# Patient Record
Sex: MALE | Race: AMERICAN INDIAN OR ALASKA NATIVE
[De-identification: names, ages, dates, MRNs, and addresses within clinical notes are randomized per-mention and may not be internally consistent; named-entity substitution may affect disease eponyms.]

---

## 2018-11-15 ENCOUNTER — HOSPITAL ENCOUNTER (EMERGENCY)
Dept: HOSPITAL 42 - ED | Age: 48
Discharge: HOME | End: 2018-11-15
Payer: COMMERCIAL

## 2018-11-15 VITALS — OXYGEN SATURATION: 100 % | RESPIRATION RATE: 18 BRPM

## 2018-11-15 VITALS — TEMPERATURE: 97.8 F | DIASTOLIC BLOOD PRESSURE: 89 MMHG | SYSTOLIC BLOOD PRESSURE: 155 MMHG

## 2018-11-15 VITALS — BODY MASS INDEX: 23 KG/M2

## 2018-11-15 VITALS — HEART RATE: 60 BPM

## 2018-11-15 DIAGNOSIS — R74.8: ICD-10-CM

## 2018-11-15 DIAGNOSIS — M79.604: ICD-10-CM

## 2018-11-15 DIAGNOSIS — R31.9: ICD-10-CM

## 2018-11-15 DIAGNOSIS — K59.00: Primary | ICD-10-CM

## 2018-11-15 LAB
ALBUMIN SERPL-MCNC: 4.6 G/DL (ref 3–4.8)
ALBUMIN/GLOB SERPL: 1.6 {RATIO} (ref 1.1–1.8)
ALT SERPL-CCNC: 40 U/L (ref 7–56)
APPEARANCE UR: CLEAR
AST SERPL-CCNC: 49 U/L (ref 17–59)
BASOPHILS # BLD AUTO: 0.01 K/MM3 (ref 0–2)
BASOPHILS NFR BLD: 0.1 % (ref 0–3)
BILIRUB UR-MCNC: NEGATIVE MG/DL
BUN SERPL-MCNC: 24 MG/DL (ref 7–21)
CALCIUM SERPL-MCNC: 9.4 MG/DL (ref 8.4–10.5)
CK MB SERPL-MCNC: 13.2 NG/ML (ref 0–3.6)
CK MB%: 1.1 % (ref 2.5–3)
COLOR UR: YELLOW
EOSINOPHIL # BLD: 0 10*3/UL (ref 0–0.7)
EOSINOPHIL NFR BLD: 0.1 % (ref 1.5–5)
ERYTHROCYTE [DISTWIDTH] IN BLOOD BY AUTOMATED COUNT: 14.8 % (ref 11.5–14.5)
GFR NON-AFRICAN AMERICAN: > 60
GLUCOSE UR STRIP-MCNC: NEGATIVE MG/DL
GRANULOCYTES # BLD: 12.97 10*3/UL (ref 1.4–6.5)
GRANULOCYTES NFR BLD: 87.2 % (ref 50–68)
HGB BLD-MCNC: 13.4 G/DL (ref 14–18)
LEUKOCYTE ESTERASE UR-ACNC: NEGATIVE LEU/UL
LIPASE SERPL-CCNC: 64 U/L (ref 23–300)
LYMPHOCYTES # BLD: 1.5 10*3/UL (ref 1.2–3.4)
LYMPHOCYTES NFR BLD AUTO: 10.4 % (ref 22–35)
MCH RBC QN AUTO: 28.5 PG (ref 25–35)
MCHC RBC AUTO-ENTMCNC: 32.8 G/DL (ref 31–37)
MCV RBC AUTO: 87 FL (ref 80–105)
MONOCYTES # BLD AUTO: 0.3 10*3/UL (ref 0.1–0.6)
MONOCYTES NFR BLD: 2.2 % (ref 1–6)
PH UR STRIP: 7 [PH] (ref 4.7–8)
PLATELET # BLD: 315 10^3/UL (ref 120–450)
PMV BLD AUTO: 9.3 FL (ref 7–11)
PROT UR STRIP-MCNC: NEGATIVE MG/DL
RBC # BLD AUTO: 4.7 10^6/UL (ref 3.5–6.1)
RBC # UR STRIP: (no result) /UL
RBC #/AREA URNS HPF: (no result) /HPF (ref 0–2)
SP GR UR STRIP: 1.02 (ref 1–1.03)
UROBILINOGEN UR STRIP-ACNC: 0.2 E.U./DL
WBC # BLD AUTO: 14.9 10^3/UL (ref 4.5–11)
WBC #/AREA URNS HPF: NEGATIVE /HPF (ref 0–6)

## 2018-11-15 PROCEDURE — 83690 ASSAY OF LIPASE: CPT

## 2018-11-15 PROCEDURE — 96361 HYDRATE IV INFUSION ADD-ON: CPT

## 2018-11-15 PROCEDURE — 99284 EMERGENCY DEPT VISIT MOD MDM: CPT

## 2018-11-15 PROCEDURE — 84100 ASSAY OF PHOSPHORUS: CPT

## 2018-11-15 PROCEDURE — 96375 TX/PRO/DX INJ NEW DRUG ADDON: CPT

## 2018-11-15 PROCEDURE — 74176 CT ABD & PELVIS W/O CONTRAST: CPT

## 2018-11-15 PROCEDURE — 82550 ASSAY OF CK (CPK): CPT

## 2018-11-15 PROCEDURE — 96374 THER/PROPH/DIAG INJ IV PUSH: CPT

## 2018-11-15 PROCEDURE — 83735 ASSAY OF MAGNESIUM: CPT

## 2018-11-15 PROCEDURE — 80053 COMPREHEN METABOLIC PANEL: CPT

## 2018-11-15 PROCEDURE — 85025 COMPLETE CBC W/AUTO DIFF WBC: CPT

## 2018-11-15 PROCEDURE — 81001 URINALYSIS AUTO W/SCOPE: CPT

## 2018-11-15 PROCEDURE — 82553 CREATINE MB FRACTION: CPT

## 2018-11-15 NOTE — CT
Date of service: 



11/15/2018



PROCEDURE:  CT Abdomen and Pelvis without intravenous contrast



HISTORY:

R flank pain radiating into groin



COMPARISON:

None.



TECHNIQUE:

Unenhanced.  Neither IV nor oral contrast administered



Radiation dose:



Total exam DLP = 465.44 mGy-cm.



This CT exam was performed using one or more of the following dose 

reduction techniques: Automated exposure control, adjustment of the 

mA and/or kV according to patient size, and/or use of iterative 

reconstruction technique.



FINDINGS:



LOWER THORAX:

Unremarkable. 



LIVER:

Unremarkable. No gross lesion or ductal dilatation.  



GALLBLADDER AND BILE DUCTS:

Unremarkable. 



PANCREAS:

Unremarkable. No gross lesion or ductal dilatation.



SPLEEN:

Unremarkable. 



ADRENALS:

Unremarkable. No mass. 



KIDNEYS AND URETERS:

Unremarkable. No hydronephrosis. No solid mass. 



VASCULATURE:

Unremarkable. No aortic aneurysm. No atherosclerotic calcification or 

mural plaque present.



BOWEL:

Constipation without fecal impaction or obstruction.  



APPENDIX:

No abnormalities to suggest acute appendicitis. No right lower 

quadrant inflammatory processes identified. 



PERITONEUM:

Unremarkable. No free fluid. No free air. 



LYMPH NODES:

Unremarkable. No enlarged lymph nodes. 



BLADDER:

Unremarkable. 



REPRODUCTIVE:

Unremarkable. 



BONES:

No acute fracture. 



OTHER FINDINGS:

None.



IMPRESSION:

No significant or acute  findings to account for/ related to the 

clinical presentation.



Additional benign and/or incidental findings described above.

## 2018-11-15 NOTE — ED PDOC
Arrival/HPI





- General


Chief Complaint: Back Pain


Time Seen by Provider: 11/15/18 14:44


Historian: Patient





- History of Present Illness


Narrative History of Present Illness (Text): 





11/15/18 15:15





48 year old male, with no significant past medical history, presents to the ED 

complaining of right hip pain since yesterday. Patient informs worsening 

symptoms with radiation to right leg and with movement of the hip. Patient 

denies any recent injury or trauma to the area. Patient denies any chest pain, 

SOB, urinary symptoms, penile discomfort, scrotal pain, neck pain, back pain, 

abdominal pain, or any other complaints. Denies chest pain or shortness of 

breath.  He denies weakness, numbness, tingling. Denies trauma.  Reports some 

heavy lifting while working in [employer]





Time/Duration: 24 hours


Symptom Onset: Gradual


Symptom Course: Unchanged


Quality: Aching


Activities at Onset: Light


Context: Home





Past Medical History





- Provider Review


Nursing Documentation Reviewed: Yes





- Infectious Disease


Hx of Infectious Diseases: None





- Cardiac


Hx Cardiac Disorders: No





- Pulmonary


Hx Respiratory Disorders: No





- Neurological


Hx Neurological Disorder: No





- HEENT


Hx HEENT Disorder: No





- Renal


Hx Renal Disorder: No





- Endocrine/Metabolic


Hx Endocrine Disorders: No





- Hematological/Oncological


Hx Blood Disorders: No





- Integumentary


Hx Dermatological Disorder: No





- Musculoskeletal/Rheumatological


Hx Musculoskeletal Disorders: No





- Gastrointestinal


Hx Gastrointestinal Disorders: No





- Genitourinary/Gynecological


Hx Genitourinary Disorders: No





- Psychiatric


Hx Psychophysiologic Disorder: No


Hx Substance Use: No





- Surgical History


Hx Orthopedic Surgery: Yes (Left Knee)





Family/Social History





- Physician Review


Nursing Documentation Reviewed: Yes


Family/Social History: Unknown Family HX


Smoking Status: Light Smoker < 10 Cigarettes Daily


Hx Alcohol Use: Yes


Frequency of alcohol use: Socially


Hx Substance Use: No





Allergies/Home Meds


Allergies/Adverse Reactions: 


Allergies





No Known Allergies Allergy (Verified 11/15/18 15:14)


   








Home Medications: 


                                    Home Meds











 Medication  Instructions  Recorded  Confirmed


 


RX: No Known Home Med  11/15/18 11/15/18














Review of Systems





- Review of Systems


Constitutional: absent: Fevers


Respiratory: absent: SOB, Cough


Cardiovascular: absent: Chest Pain, CUEVAS


Gastrointestinal: absent: Abdominal Pain, Constipation, Diarrhea, Nausea, 

Vomiting


Genitourinary Male: absent: Dysuria, Hematuria, Urinary Output Changes


Musculoskeletal: Other (right sided hip pain with radiation to R leg and groin).

 absent: Back Pain, Neck Pain


Skin: absent: Rash


Neurological: absent: Headache, Dizziness


Psychiatric: absent: Anxiety





Physical Exam


Vital Signs Reviewed: Yes





Vital Signs











  Temp Pulse Resp BP Pulse Ox


 


 11/15/18 15:49  97.7 F  60  18  155/90 H  100


 


 11/15/18 15:14  97.7 F  60  19  162/112 H  99











Temperature: Afebrile


Blood Pressure: Hypertensive


Pulse: Regular


Respiratory Rate: Normal


Appearance: Positive for: Well-Appearing, Non-Toxic, Comfortable


Pain Distress: None


Mental Status: Positive for: Alert and Oriented X 3





- Systems Exam


Head: Present: Atraumatic, Normocephalic


Pupils: Present: PERRL


Extroacular Muscles: Present: EOMI


Conjunctiva: Present: Normal


Mouth: Present: Moist Mucous Membranes


Neck: Present: Normal Range of Motion


Respiratory/Chest: Present: Clear to Auscultation, Good Air Exchange.  No: 

Respiratory Distress, Accessory Muscle Use


Cardiovascular: Present: Regular Rate and Rhythm, Normal S1, S2.  No: Murmurs


Abdomen: No: Tenderness, Distention, Peritoneal Signs


Back: Present: Normal Inspection


Upper Extremity: Present: Normal Inspection, Normal ROM, NORMAL PULSES, 

Neurovascularly Intact.  No: Cyanosis, Edema, Deformity


Lower Extremity: Present: Normal Inspection, NORMAL PULSES, Normal ROM, 

Neurovascularly Intact, Other (Muscle spasm noted to right lateral thigh).  No: 

Edema, Swelling, Deformity


Neurological: Present: GCS=15, CN II-XII Intact, Speech Normal


Skin: Present: Warm, Dry, Normal Color.  No: Rashes


Psychiatric: Present: Alert, Oriented x 3, Normal Insight, Normal Concentration





Medical Decision Making


ED Course and Treatment: 





11/15/18 15:15


Impression: 48 year old male presents to the ED complaining of right sided hip 

pain. 





Plan:


-- Labs


-- Toradol


-- Valium


-- Urinalysis 





Progress Notes: 





11/15/18 17:13


CT LOWER THORAX:


Unremarkable. 





LIVER:


Unremarkable. No gross lesion or ductal dilatation.  





GALLBLADDER AND BILE DUCTS:


Unremarkable. 





PANCREAS:


Unremarkable. No gross lesion or ductal dilatation.





SPLEEN:


Unremarkable. 





ADRENALS:


Unremarkable. No mass. 





KIDNEYS AND URETERS:


Unremarkable. No hydronephrosis. No solid mass. 





VASCULATURE:


Unremarkable. No aortic aneurysm. No atherosclerotic calcification or mural 

plaque present.





BOWEL:


Constipation without fecal impaction or obstruction.  





APPENDIX:


No abnormalities to suggest acute appendicitis. No right lower quadrant 

inflammatory processes identified. 





PERITONEUM:


Unremarkable. No free fluid. No free air. 





LYMPH NODES:


Unremarkable. No enlarged lymph nodes. 





BLADDER:


Unremarkable. 





REPRODUCTIVE:


Unremarkable. 





BONES:


No acute fracture. 





OTHER FINDINGS:


None.





IMPRESSION:


No significant or acute  findings to account for/ related to the clinical 

presentation.





Additional benign and/or incidental findings described above.





11/15/18 17:28


Patient made aware of abnormal labs and constipation on CT.  Presentation may be

due to sciatica due to pain down into leg.  Feels better after 2L IVF.





- Medication Orders


Current Medication Orders: 














Discontinued Medications





Diazepam (Valium)  5 mg PO STAT STA; Protocol


   Stop: 11/15/18 15:16


   Last Admin: 11/15/18 15:40  Dose: 5 mg





Ketorolac Tromethamine (Toradol)  30 mg IVP STAT STA


   Stop: 11/15/18 15:16


   Last Admin: 11/15/18 15:41  Dose: 30 mg





MAR Pain Assessment


 Document     11/15/18 15:41  LA  (Rec: 11/15/18 15:41  LA  [facility] [place]-[facility]-20)


     Pain Reassessment


      Is this a pain reassessment?               No


     Sleep


      Is patient sleeping during reassessment?   No


     Presence of Pain


      Presence of Pain                           Yes


     Pain Scale Used


     Protocol:  PSCALES


      Pain Scale Used                            Numeric


     Location


      Left, Right or Bilateral                   Right


      Pain Location Body Site                    Abdomen


IVP Administration


 Document     11/15/18 15:41  LA  (Rec: 11/15/18 15:41  LA  [facility] [place])


     Charges for Administration


      # of IVP Administrations                   1














- Scribe Statement


The provider has reviewed the documentation as recorded by the Scribe


Ena Perez.





All medical record entries made by the Katalinaibe were at my direction and 

personally dictated by me. I have reviewed the chart and agree that the record 

accurately reflects my personal performance of the history, physical exam, 

medical decision making, and the department course for this patient. I have also

personally directed, reviewed, and agree with the discharge instructions and 

disposition.





Disposition/Present on Arrival





- Present on Arrival


Any Indicators Present on Arrival: No


History of DVT/PE: No


History of Uncontrolled Diabetes: No


Urinary Catheter: No


History of Decub. Ulcer: No


History Surgical Site Infection Following: None





- Disposition


Have Diagnosis and Disposition been Completed?: Yes


Diagnosis: 


 Elevated CK, Sciatic leg pain, Constipation, Hematuria





Disposition: HOME/ ROUTINE


Disposition Time: 17:28


Patient Plan: Discharge


Condition: GOOD


Discharge Instructions (ExitCare):  Sciatica, Rhabdomyolysis, Constipation in 

Adults, Blood in the Urine (Hematuria), Adult (DC), Sciatica Exercises


Additional Instructions: 


Increase fluids.  Follow-up with PMD within 2 days.  Return to ED if condition 

worsens.


Referrals: 


Sheldon Nunn MD [Staff Provider] - Follow up with primary


Forms:  [employer] (English), WORK NOTE